# Patient Record
Sex: MALE | Race: WHITE | Employment: OTHER | URBAN - METROPOLITAN AREA
[De-identification: names, ages, dates, MRNs, and addresses within clinical notes are randomized per-mention and may not be internally consistent; named-entity substitution may affect disease eponyms.]

---

## 2023-02-28 ENCOUNTER — ANESTHESIA EVENT (OUTPATIENT)
Dept: ENDOSCOPY | Age: 68
End: 2023-02-28
Payer: MEDICARE

## 2023-02-28 ENCOUNTER — HOSPITAL ENCOUNTER (OUTPATIENT)
Age: 68
Setting detail: OUTPATIENT SURGERY
Discharge: HOME | End: 2023-02-28
Attending: INTERNAL MEDICINE | Admitting: INTERNAL MEDICINE
Payer: MEDICARE

## 2023-02-28 ENCOUNTER — ANESTHESIA (OUTPATIENT)
Dept: ENDOSCOPY | Age: 68
End: 2023-02-28
Payer: MEDICARE

## 2023-02-28 VITALS
SYSTOLIC BLOOD PRESSURE: 129 MMHG | TEMPERATURE: 98 F | HEART RATE: 62 BPM | RESPIRATION RATE: 17 BRPM | DIASTOLIC BLOOD PRESSURE: 89 MMHG | WEIGHT: 202 LBS | OXYGEN SATURATION: 97 % | BODY MASS INDEX: 28.92 KG/M2 | HEIGHT: 70 IN

## 2023-02-28 PROCEDURE — 77030013992 HC SNR POLYP ENDOSC BSC -B: Performed by: INTERNAL MEDICINE

## 2023-02-28 PROCEDURE — 76040000019: Performed by: INTERNAL MEDICINE

## 2023-02-28 PROCEDURE — 77030010936 HC CLP LIG BSC -C: Performed by: INTERNAL MEDICINE

## 2023-02-28 PROCEDURE — 77030009426 HC FCPS BIOP ENDOSC BSC -B: Performed by: INTERNAL MEDICINE

## 2023-02-28 PROCEDURE — 74011000250 HC RX REV CODE- 250: Performed by: NURSE ANESTHETIST, CERTIFIED REGISTERED

## 2023-02-28 PROCEDURE — 74011250636 HC RX REV CODE- 250/636: Performed by: NURSE ANESTHETIST, CERTIFIED REGISTERED

## 2023-02-28 PROCEDURE — 88305 TISSUE EXAM BY PATHOLOGIST: CPT

## 2023-02-28 PROCEDURE — 74011250637 HC RX REV CODE- 250/637: Performed by: INTERNAL MEDICINE

## 2023-02-28 PROCEDURE — 76060000031 HC ANESTHESIA FIRST 0.5 HR: Performed by: INTERNAL MEDICINE

## 2023-02-28 PROCEDURE — 2709999900 HC NON-CHARGEABLE SUPPLY: Performed by: INTERNAL MEDICINE

## 2023-02-28 DEVICE — WORKING LENGTH 235CM, WORKING CHANNEL 2.8MM
Type: IMPLANTABLE DEVICE | Site: SIGMOID COLON | Status: FUNCTIONAL
Brand: RESOLUTION 360 CLIP

## 2023-02-28 RX ORDER — SODIUM CHLORIDE 9 MG/ML
50 INJECTION, SOLUTION INTRAVENOUS CONTINUOUS
Status: DISCONTINUED | OUTPATIENT
Start: 2023-02-28 | End: 2023-02-28 | Stop reason: HOSPADM

## 2023-02-28 RX ORDER — SODIUM CHLORIDE 0.9 % (FLUSH) 0.9 %
5-40 SYRINGE (ML) INJECTION EVERY 8 HOURS
Status: DISCONTINUED | OUTPATIENT
Start: 2023-02-28 | End: 2023-02-28 | Stop reason: HOSPADM

## 2023-02-28 RX ORDER — LIDOCAINE HYDROCHLORIDE 20 MG/ML
INJECTION, SOLUTION EPIDURAL; INFILTRATION; INTRACAUDAL; PERINEURAL AS NEEDED
Status: DISCONTINUED | OUTPATIENT
Start: 2023-02-28 | End: 2023-02-28 | Stop reason: HOSPADM

## 2023-02-28 RX ORDER — NALOXONE HYDROCHLORIDE 0.4 MG/ML
0.4 INJECTION, SOLUTION INTRAMUSCULAR; INTRAVENOUS; SUBCUTANEOUS
Status: DISCONTINUED | OUTPATIENT
Start: 2023-02-28 | End: 2023-02-28 | Stop reason: HOSPADM

## 2023-02-28 RX ORDER — MIDAZOLAM HYDROCHLORIDE 1 MG/ML
.25-5 INJECTION, SOLUTION INTRAMUSCULAR; INTRAVENOUS
Status: DISCONTINUED | OUTPATIENT
Start: 2023-02-28 | End: 2023-02-28 | Stop reason: HOSPADM

## 2023-02-28 RX ORDER — DEXTROMETHORPHAN/PSEUDOEPHED 2.5-7.5/.8
1.2 DROPS ORAL
Status: DISCONTINUED | OUTPATIENT
Start: 2023-02-28 | End: 2023-02-28 | Stop reason: HOSPADM

## 2023-02-28 RX ORDER — PROPOFOL 10 MG/ML
INJECTION, EMULSION INTRAVENOUS AS NEEDED
Status: DISCONTINUED | OUTPATIENT
Start: 2023-02-28 | End: 2023-02-28 | Stop reason: HOSPADM

## 2023-02-28 RX ORDER — EPINEPHRINE 0.1 MG/ML
1 INJECTION INTRACARDIAC; INTRAVENOUS
Status: DISCONTINUED | OUTPATIENT
Start: 2023-02-28 | End: 2023-02-28 | Stop reason: HOSPADM

## 2023-02-28 RX ORDER — SODIUM CHLORIDE 0.9 % (FLUSH) 0.9 %
5-40 SYRINGE (ML) INJECTION AS NEEDED
Status: DISCONTINUED | OUTPATIENT
Start: 2023-02-28 | End: 2023-02-28 | Stop reason: HOSPADM

## 2023-02-28 RX ORDER — ATROPINE SULFATE 0.1 MG/ML
0.5 INJECTION INTRAVENOUS
Status: DISCONTINUED | OUTPATIENT
Start: 2023-02-28 | End: 2023-02-28 | Stop reason: HOSPADM

## 2023-02-28 RX ORDER — FENTANYL CITRATE 50 UG/ML
25-200 INJECTION, SOLUTION INTRAMUSCULAR; INTRAVENOUS
Status: DISCONTINUED | OUTPATIENT
Start: 2023-02-28 | End: 2023-02-28 | Stop reason: HOSPADM

## 2023-02-28 RX ORDER — FLUMAZENIL 0.1 MG/ML
0.2 INJECTION INTRAVENOUS
Status: DISCONTINUED | OUTPATIENT
Start: 2023-02-28 | End: 2023-02-28 | Stop reason: HOSPADM

## 2023-02-28 RX ORDER — POLYETHYLENE GLYCOL-3350 AND ELECTROLYTES 236; 6.74; 5.86; 2.97; 22.74 G/274.31G; G/274.31G; G/274.31G; G/274.31G; G/274.31G
POWDER, FOR SOLUTION ORAL
COMMUNITY
Start: 2023-02-24

## 2023-02-28 RX ADMIN — PROPOFOL 30 MG: 10 INJECTION, EMULSION INTRAVENOUS at 10:25

## 2023-02-28 RX ADMIN — PROPOFOL 70 MG: 10 INJECTION, EMULSION INTRAVENOUS at 10:21

## 2023-02-28 RX ADMIN — PROPOFOL 30 MG: 10 INJECTION, EMULSION INTRAVENOUS at 10:29

## 2023-02-28 RX ADMIN — PROPOFOL 30 MG: 10 INJECTION, EMULSION INTRAVENOUS at 10:23

## 2023-02-28 RX ADMIN — PROPOFOL 50 MG: 10 INJECTION, EMULSION INTRAVENOUS at 10:33

## 2023-02-28 RX ADMIN — LIDOCAINE HYDROCHLORIDE 50 MG: 20 INJECTION, SOLUTION EPIDURAL; INFILTRATION; INTRACAUDAL; PERINEURAL at 10:21

## 2023-02-28 RX ADMIN — PROPOFOL 40 MG: 10 INJECTION, EMULSION INTRAVENOUS at 10:26

## 2023-02-28 NOTE — DISCHARGE INSTRUCTIONS
[address], [address] [place] [address]    COLON DISCHARGE INSTRUCTIONS    Sudarshan Chacon  [id number]  1955    Discomfort:  Redness at IV site- apply warm compress to area; if redness or soreness persist- contact your physician  There may be a slight amount of blood passed from the rectum  Gaseous discomfort- walking, belching will help relieve any discomfort  You may not operate a vehicle for 12 hours  You may not engage in an occupation involving machinery or appliances for rest of today  You may not drink alcoholic beverages for at least 12 hours  Avoid making any critical decisions for at least 24 hour  DIET:  You may resume your regular diet - however -  remember your colon is empty and a heavy meal will produce gas. Avoid these foods:  vegetables, fried / greasy foods, carbonated drinks     ACTIVITY:  You may  resume your normal daily activities it is recommended that you spend the remainder of the day resting -  avoid any strenuous activity. CALL M.D.   ANY SIGN OF:   Increasing pain, nausea, vomiting  Abdominal distension (swelling)  New increased bleeding (oral or rectal)  Fever (chills)  Pain in chest area  Bloody discharge from nose or mouth  Shortness of breath      Follow-up Instructions:   Call Dr. Vickie Wright for any questions or problems at [phone]   High fiber diet   Avoid NSAIDS for next 3 days           ENDOSCOPY FINDINGS:   Your colonoscopy showed 2 polyps removed and mild diverticulosis, rest of exam was normal.  Telephone # [phone]      Signed By: Vickie Wright MD     2/28/2023  10:50 AM

## 2023-02-28 NOTE — PROCEDURES
[address], [address] [place] [address]      Colonoscopy Operative Report    Lucio Do  [id number]  1955      Procedure Type:   Colonoscopy with polypectomy (snare cautery)     Indications:  POSITIVE FIT test  First colonoscopy       Pre-operative Diagnosis: see indication above    Post-operative Diagnosis:  See findings below    :  Brenden Butler MD    Surgical Assistant: Endoscopy Technician-1: Brenden Damon  Endoscopy RN-1: Donovan Hess RN    Implants:  None    Referring Provider: Thierry Uribe, Not On File, FNP      Sedation:  MAC anesthesia Propofol      Procedure Details:  After informed consent was obtained with all risks and benefits of procedure explained and preoperative exam completed, the patient was taken to the endoscopy suite and placed in the left lateral decubitus position. Upon sequential sedation as per above, a digital rectal exam was performed demonstrating internal hemorrhoids. The Olympus videocolonoscope  was inserted in the rectum and carefully advanced to the cecum, which was identified by the ileocecal valve and appendiceal orifice. The cecum was identified by the ileocecal valve and appendiceal orifice. The quality of preparation was good. The colonoscope was slowly withdrawn with careful evaluation between folds. Retroflexion in the rectum was completed . Findings:   Rectum: normal  Sigmoid: 3 cm pedunculated polyp, located at 25 cm from anus, removed by hot snaring in one piece, I placed one hemoclip at site to prevent any bleeding     Descending Colon: normal  Transverse Colon: normal  Ascending Colon: 8 mm sessile polyp removed by hot biopsies    Cecum: normal    Diffuse and mild diverticulosis, seen throughout the colon     Specimen Removed:   as above     Complications: None. EBL:  None. Impression:     see findings    Recommendations: --Await pathology.       Recommendation for next colonscopy in 3 years by  [facility]   High fiber diet    Avoid NSAIDS for next 3 days     Signed By: Herman Zhang MD     2/28/2023  10:45 AM

## 2023-02-28 NOTE — H&P
The patient is a 74year old male who presents with a complaint of Hemoccult Positive Stool. Note for \"Hemoccult Positive Stool\": 75 y/o male presents for + FIT test 11/30/22. He has seen faint red blood on tissues. Denies anorectal pain or discomfort. Has BM every AM. He does not extend time on toilet. He is very regular. BM are brown and soft. BM are brown and smooth. Eats well/appetite intact. Denies unintentional weight loss. Denies abdominal pain. Denies FH CRC/IBD. Drink on occasion and no smoking. Overall he feels really good and has no complaints. Denies acid reflux/n/v/diarrhea. Denies previous Hx of colonoscopy. Denies Hx DM. Denies use of blood thinner/anticoag. Problem List/Past Medical (Faye Hillman; 2/24/2023 11:22 AM)  Hyperlipidemia    Hypertension      Past Surgical History (Faye Hillman; 2/24/2023 11:28 AM)  Oral Surgery      Allergies (Faye Hillman; 2/24/2023 11:28 AM)  No Known Drug Allergies   [02/24/2023]:  No Known Allergies   [02/24/2023]: Medication History (Faye Hillman; 2/24/2023 11:28 AM)  atorvastatin  (10mg tablet, 1 oral daily) Active. olmesartan-hydrochlorothiazide  (20-12.5mg tablet, 1 oral daily) Active. Medications Reconciled     Family History (Faye Hillman; 2/24/2023 11:29 AM)  Non Hodgkin's lymphoma (C85.90)      Social History (Faye Hillman; 2/24/2023 11:32 AM)  Alcohol Use   Occasional alcohol use. Tobacco Use   Never smoker. Employment status   Full-time. Self employed. Marital status   . Health Maintenance History (Faye Hillman; 2/24/2023 11:22 AM)  Flu Vaccine    COVID-19 vaccine          Review of Systems (Faye Hillman; 2/24/2023 11:21 AM)  General Not Present- Chronic Fatigue, Poor Appetite, Weight Gain and Weight Loss. Skin Not Present- Itching, Rash and Skin Color Changes. HEENT Not Present- Hearing Loss and Vertigo. Respiratory Not Present- Difficulty Breathing and TB exposure.   Cardiovascular Not Present- Chest Pain, Use of Antibiotics before Dental Procedures and Use of Blood Thinners. Gastrointestinal Present- See HPI. Musculoskeletal Not Present- Arthritis, Hip Replacement Surgery and Knee Replacement Surgery. Neurological Not Present- Weakness. Psychiatric Not Present- Depression. Endocrine Not Present- Diabetes and Thyroid Problems. Hematology Not Present- Anemia. Vitals (Bakari Mcghee; 2/24/2023 11:26 AM)  2/24/2023 11:23 AM  Weight: 202 lb   Height: 70 in   Height was reported by patient. Body Surface Area: 2.1 m²   Body Mass Index: 28.98 kg/m²    Temp.: 97.3° F  (Thermal Scan)    Pulse: 50 (Regular)    Resp. : 16 (Unlabored)     BP: 148/88(Sitting, Left Arm, Standard)              Physical Exam Rhiannon [facility]; 2/24/2023 11:56 AM)  General  Mental Status - Alert. General Appearance - Cooperative, Pleasant, Not in acute distress. Orientation - Oriented X3. Build & Nutrition - Well nourished and Well developed. Integumentary  General Characteristics  Overall examination of the patient's skin reveals - no rashes, no bruises and no spider angiomas. Color - normal coloration of skin. Head and Neck  Neck  Global Assessment - full range of motion and supple, no bruit auscultated on the right, no bruit auscultated on the left, non-tender, no lymphadenopathy. Eye  Eyeball - Left - No Exophthalmos - Left. Eyeball - Right - No Exophthalmos - Right. Sclera/Conjunctiva - Left - No Jaundice - Left. Sclera/Conjunctiva - Right - No Jaundice - Right. ENMT  Mouth and Throat  Oral Cavity/Oropharynx - Teeth - normal. Oropharynx - Normal, no evidence of airway distress observed. Chest and Lung Exam  Chest and lung exam reveals  - quiet, even and easy respiratory effort with no use of accessory muscles. Auscultation  Breath sounds - Normal. Adventitious sounds - No Adventitious sounds. Cardiovascular  Auscultation  Rhythm - Regular, No Tachycardia, No Bradycardia .  Heart Sounds - Normal heart sounds , S1 WNL and S2 WNL, No S3, No Summation Gallop. Murmurs & Other Heart Sounds - Auscultation of the heart reveals - No Murmurs. Abdomen  Inspection  Inspection of the abdomen reveals - Non-distended. Incisional scars - No incisional scars. Palpation/Percussion  Tenderness - Non-Tender. Rebound tenderness - No rebound. Liver - No hepatosplenomegaly. Abdominal Mass Palpable - No masses. Other Characteristics - No Ascites. Organomegally - None. Auscultation  Auscultation of the abdomen reveals - Bowel sounds normal, No Abdominal bruits and No Succussion splash. Rectal - Did not examine. Peripheral Vascular  Upper Extremity  Inspection - Left - Normal - No Clubbing, No Cyanosis, No Edema, Pulses Intact. Inspection - Right - Normal - No Clubbing, No Cyanosis, No Edema, Pulses Intact. Palpation - Edema - Left - No edema - Left. Edema - Right - No edema - Right. Lower Extremity  Inspection - Left - Inspection Normal. Inspection - Right - Inspection Normal. Palpation - Edema - Left - No edema - Left. Edema - Right - No edema - Right. Neurologic  Neurologic evaluation reveals  - Cranial nerves grossly intact, no focal neurologic deficits and upper and lower extremity deep tendon reflexes intact bilaterally . Cranial Nerves - Normal Bilaterally. Motor  Involuntary Movements - Asterixis - not present. Assessment & Plan Elsi Barroso [facility]; 2/28/2023 7:46 AM)    Positive FIT (fecal immunochemical test) (R19.5)    Current Plans  Pt Education - How to access health information online: discussed with patient and provided information. Patient is to call me for any questions or concerns. This patient was reviewed and seen in collaboration with Dr. Debi Cabrera.  COLONOSCOPY/BIOPSY/POLYP (74526) (47363)  Started Golytely 236 gram-22.74 gram-6.74 gram-5.86 gram oral solution, 1 container Milliliter as directed before your colonoscopy, 4000 Milliliter, 02/24/2023, No Refill.     Rectal bleed (K62.5)    Note: 67 y/o male presents for + FIT test 11/30/22. He has seen faint red blood on tissues. Denies anorectal pain or discomfort. Has BM every AM. He does not extend time on toilet. He is very regular. BM are brown and soft. BM are brown and smooth. Eats well/appetite intact. Denies unintentional weight loss. Denies abdominal pain. Denies FH CRC/IBD. Drink on occasion and no smoking. Overall he feels really good and has no complaints. Denies acid reflux/n/v/diarrhea. Denies previous Hx of colonoscopy. Denies Hx DM. Denies use of blood thinner/anticoag. Plan  CLN w biopsy to evaluate for CRC/malignancy/colonic bleeding/hemorrhoids/AVM. Discussed process of procedure with patient including risks for bleeding and infection. Patient verbalizes understanding of information provided and plan of care. Advised patient to call me with questions or concerns      Date of Surgery Update:  Lucio Do was seen and examined. History and physical has been reviewed. The patient has been examined. There have been no significant clinical changes since the completion of the originally dated History and Physical.  The patient was counseled at length about the risks of dede Covid-19 in the bo-operative and post-operative states including the recovery window of their procedure. The patient was made aware that dede Covid-19 after a surgical procedure may worsen their prognosis for recovering from the virus and lend to a higher morbidity and or mortality risk. The patient was given the options of postponing their procedure. All of the risks, benefits, and alternatives were discussed. The patient does  wish to proceed with the procedure.      Signed By: Brenden Butler MD     February 28, 2023 10:18 AM

## 2023-02-28 NOTE — ANESTHESIA POSTPROCEDURE EVALUATION
Procedure(s):  COLONOSCOPY  ENDOSCOPIC POLYPECTOMY. MAC    Anesthesia Post Evaluation        Patient location during evaluation: PACU  Patient participation: complete - patient participated  Level of consciousness: awake and alert  Pain management: adequate  Airway patency: patent  Anesthetic complications: no  Cardiovascular status: acceptable  Respiratory status: acceptable  Hydration status: acceptable  Comments: I have seen and evaluated the patient and is ready for discharge. Sonali Galan MD    Post anesthesia nausea and vomiting:  none      INITIAL Post-op Vital signs: No vitals data found for the desired time range.

## 2023-02-28 NOTE — PERIOP NOTES
Initial RN admission and assessment performed and documented in Endoscopy navigator. Patient evaluated by anesthesia in pre-procedure holding. All procedural vital signs, airway assessment, and level of consciousness information monitored and recorded by anesthesia staff on the anesthesia record. Specimens labeled and reviewed with physician. Report received from CRNA post procedure. Patient transported to recovery area by RN. Endoscope was pre-cleaned at bedside immediately following procedure by Anabelle.

## (undated) DEVICE — FCPS BX HOT RJ4 2.2MMX240CM -- RADIAL JAW 4 BX/40

## (undated) DEVICE — REM POLYHESIVE ADULT PATIENT RETURN ELECTRODE: Brand: VALLEYLAB

## (undated) DEVICE — TUBING HYDR IRR --

## (undated) DEVICE — SNARE ENDOSCP M L240CM W27MM SHTH DIA2.4MM CHN 2.8MM OVL

## (undated) DEVICE — SPECIMEN TRAP QUAD CHMBR -- TRAPEASE